# Patient Record
Sex: FEMALE | Race: BLACK OR AFRICAN AMERICAN | HISPANIC OR LATINO | Employment: UNEMPLOYED | URBAN - METROPOLITAN AREA
[De-identification: names, ages, dates, MRNs, and addresses within clinical notes are randomized per-mention and may not be internally consistent; named-entity substitution may affect disease eponyms.]

---

## 2019-05-13 ENCOUNTER — HOSPITAL ENCOUNTER (EMERGENCY)
Facility: HOSPITAL | Age: 24
Discharge: HOME | End: 2019-05-13
Attending: EMERGENCY MEDICINE
Payer: MEDICAID

## 2019-05-13 VITALS
TEMPERATURE: 99 F | DIASTOLIC BLOOD PRESSURE: 70 MMHG | SYSTOLIC BLOOD PRESSURE: 129 MMHG | RESPIRATION RATE: 20 BRPM | HEIGHT: 65 IN | HEART RATE: 82 BPM | WEIGHT: 130 LBS | OXYGEN SATURATION: 100 % | BODY MASS INDEX: 21.66 KG/M2

## 2019-05-13 DIAGNOSIS — R11.0 NAUSEA: ICD-10-CM

## 2019-05-13 DIAGNOSIS — F41.9 ANXIETY: Primary | ICD-10-CM

## 2019-05-13 DIAGNOSIS — R07.9 CHEST PAIN: ICD-10-CM

## 2019-05-13 DIAGNOSIS — R07.89 CHEST WALL PAIN: ICD-10-CM

## 2019-05-13 LAB
B-HCG UR QL: NEGATIVE
CTP QC/QA: YES

## 2019-05-13 PROCEDURE — 99284 EMERGENCY DEPT VISIT MOD MDM: CPT | Mod: 25

## 2019-05-13 PROCEDURE — 93005 ELECTROCARDIOGRAM TRACING: CPT

## 2019-05-13 PROCEDURE — 25000003 PHARM REV CODE 250: Performed by: NURSE PRACTITIONER

## 2019-05-13 PROCEDURE — 81025 URINE PREGNANCY TEST: CPT | Performed by: NURSE PRACTITIONER

## 2019-05-13 RX ORDER — IBUPROFEN 600 MG/1
600 TABLET ORAL
Status: COMPLETED | OUTPATIENT
Start: 2019-05-13 | End: 2019-05-13

## 2019-05-13 RX ORDER — ONDANSETRON 4 MG/1
4 TABLET, ORALLY DISINTEGRATING ORAL
Status: COMPLETED | OUTPATIENT
Start: 2019-05-13 | End: 2019-05-13

## 2019-05-13 RX ORDER — LORAZEPAM 0.5 MG/1
0.5 TABLET ORAL
Status: COMPLETED | OUTPATIENT
Start: 2019-05-13 | End: 2019-05-13

## 2019-05-13 RX ORDER — ACETAMINOPHEN 325 MG/1
650 TABLET ORAL
Status: DISCONTINUED | OUTPATIENT
Start: 2019-05-13 | End: 2019-05-13

## 2019-05-13 RX ORDER — ONDANSETRON 4 MG/1
4 TABLET, ORALLY DISINTEGRATING ORAL EVERY 8 HOURS PRN
Qty: 10 TABLET | Refills: 0 | Status: SHIPPED | OUTPATIENT
Start: 2019-05-13 | End: 2019-05-24

## 2019-05-13 RX ADMIN — IBUPROFEN 600 MG: 600 TABLET, FILM COATED ORAL at 11:05

## 2019-05-13 RX ADMIN — LORAZEPAM 0.5 MG: 0.5 TABLET ORAL at 11:05

## 2019-05-13 RX ADMIN — ONDANSETRON 4 MG: 4 TABLET, ORALLY DISINTEGRATING ORAL at 11:05

## 2019-05-13 NOTE — ED NOTES
Pt up to bathroom. Very dramatic, stating she is dying and then continually asking if she is okay, is her heart okay. Redirectable.

## 2019-05-13 NOTE — DISCHARGE INSTRUCTIONS
Increase your water intake. Return to the ED if your condition changes, progresses, or if you have any concerns.

## 2019-05-13 NOTE — ED TRIAGE NOTES
"Pt reports that she is from [place] and is visiting her cousin here. She reports that she has a hx of alcohol abuse and has been drinking heavily since Thursday. Pt arrives to ER with complaints of chest pain and feelings that "she is going to die". Pt is very dramatic, erratic behavior and continually asking if her heart is okay, and if she is going to die. Redirectable.  "

## 2019-05-13 NOTE — ED NOTES
APPEARANCE: Alert, oriented and in no acute distress.  CARDIAC: Normal rate and rhythm, no murmur heard.   PERIPHERAL VASCULAR: peripheral pulses present. Normal cap refill. No edema. Warm to touch.    RESPIRATORY:Normal rate and effort, breath sounds clear bilaterally throughout chest. Respirations are equal and unlabored no obvious signs of distress.  GASTRO: soft, bowel sounds normal, no tenderness, no abdominal distention.  MUSC: Full ROM. No bony tenderness or soft tissue tenderness. No obvious deformity.  SKIN: Skin is warm and dry, normal skin turgor, mucous membranes moist.  NEURO: 5/5 strength major flexors/extensors bilaterally. Sensory intact to light touch bilaterally. Oaks coma scale: eyes open spontaneously-4, oriented & converses-5, obeys commands-6. No neurological abnormalities.   MENTAL STATUS: awake, alert and aware of environment.  EYE: PERRL, both eyes: pupils brisk and reactive to light. Normal size.  ENT: EARS: no obvious drainage. NOSE: no active bleeding.   Pt complains of chest pain and alcohol withdrawals. Dramatic in triage and when brought back to room.

## 2019-05-13 NOTE — ED PROVIDER NOTES
"Encounter Date: 5/13/2019       History     Chief Complaint   Patient presents with    Chest Pain     that began yesterday  pt also complains of increased anxiety, and drinking heavy for the past 3 days and states " i feel like i am in withdrawls"      23-year-old female presents the ED for chest pain.  The patient reports that since Sunday she has had constant left sided chest pain. Pain is worse with movement and certain positions.  She denies trauma, fever/chills, cough, hemoptysis, and vomiting.  The patient reports that she is anxious but left her anxiety medications at home in [place].  Pt flew from [place] on Thursday.  She is unsure of the name of her medication.  Pt also admits to drinking alcohol heavily since Thursday.  Her last drink was yesterday around 2pm.  She denies vomiting but does have nausea.  She denies drug use and history of cardiac disease.  Pt states that she feels like she is in alcohol withdrawals as she feels "shaky and my anxiety is messing with me."  The patient reports that she has never had to go to rehab due to alcohol abuse.  Pt does smoke cigarettes.  No other complaints at this time.     The history is provided by the patient and a friend.     Review of patient's allergies indicates:   Allergen Reactions    Sulfa (sulfonamide antibiotics)      History reviewed. No pertinent past medical history.  No past surgical history on file.  History reviewed. No pertinent family history.  Social History     Tobacco Use    Smoking status: Not on file   Substance Use Topics    Alcohol use: Not on file    Drug use: Not on file     Review of Systems   Constitutional: Positive for appetite change and chills. Negative for fever.   HENT: Negative for congestion.    Respiratory: Negative for cough and shortness of breath.    Cardiovascular: Positive for chest pain and palpitations.   Gastrointestinal: Positive for nausea. Negative for diarrhea and vomiting.   Musculoskeletal: Negative for " back pain.   Skin: Negative for rash.   Neurological: Negative for weakness, light-headedness and headaches.   Hematological: Does not bruise/bleed easily.   Psychiatric/Behavioral: Negative for confusion. The patient is nervous/anxious.        Physical Exam     Initial Vitals [05/13/19 1038]   BP Pulse Resp Temp SpO2   127/78 106 20 98.5 °F (36.9 °C) 97 %      MAP       --         Physical Exam    Nursing note and vitals reviewed.  Constitutional: Vital signs are normal. She appears well-developed and well-nourished. She is active and cooperative. She is easily aroused.  Non-toxic appearance. She does not have a sickly appearance. She does not appear ill. No distress.   Patient is clinically sober.   HENT:   Head: Normocephalic and atraumatic.   Eyes: Conjunctivae and EOM are normal.   Neck: Normal range of motion. Neck supple.   Cardiovascular: Normal rate, regular rhythm and normal heart sounds.   Pulses:       Radial pulses are 2+ on the right side, and 2+ on the left side.   Pulmonary/Chest: Effort normal and breath sounds normal. She exhibits tenderness and bony tenderness. She exhibits no crepitus, no edema, no deformity, no swelling and no retraction.       Abdominal: Soft. Normal appearance and bowel sounds are normal. She exhibits no distension. There is no tenderness. There is no rigidity and no guarding.   Musculoskeletal:   No LE edema, pain, or erythema.    Neurological: She is alert, oriented to person, place, and time and easily aroused. She has normal strength. She displays no tremor. No sensory deficit. Coordination and gait normal. GCS eye subscore is 4. GCS verbal subscore is 5. GCS motor subscore is 6.   Skin: Skin is warm, dry and intact. Capillary refill takes less than 2 seconds. No bruising and no rash noted. No erythema. No pallor.   Psychiatric: Her speech is normal and behavior is normal. Judgment and thought content normal. Her mood appears anxious. Cognition and memory are normal.  "        ED Course   Procedures  Labs Reviewed   POCT URINE PREGNANCY          Imaging Results          X-Ray Chest 1 View (Final result)  Result time 05/13/19 11:08:42    Final result by Josue Brown MD (05/13/19 11:08:42)                 Impression:      No acute abnormality.      Electronically signed by: Josue Brown MD  Date:    05/13/2019  Time:    11:08             Narrative:    EXAMINATION:  XR CHEST 1 VIEW    CLINICAL HISTORY:  Chest pain, unspecified    TECHNIQUE:  Single frontal view of the chest was performed.    COMPARISON:  None    FINDINGS:  The lungs are clear, with normal appearance of pulmonary vasculature and no pleural effusion or pneumothorax.    The cardiac silhouette is normal in size. The hilar and mediastinal contours are unremarkable.    Bones are intact.                                 Medical Decision Making:   Initial Assessment:   23-year-old female here for left-sided chest pain constantly since yesterday.  She also reports to heavy alcohol consumption over the past few days.  Her last drink was yesterday around 2:00 p.m..  No vomiting. She denies drug use.  Patient appears well, nontoxic.  Vitals stable.  Reproducible left anterior chest pain to palpation. No respiratory distress. No tremors or seizure activity.  Differential Diagnosis:   Anxiety, chest wall pain, pneumothorax, dysrhythmia  Clinical Tests:   Radiological Study: Ordered and Reviewed  Medical Tests: Ordered and Reviewed  ED Management:  Zofran ODT, Motrin, chest x-ray, EKG  Other:   I have discussed this case with another health care provider.       <> Summary of the Discussion: Reviewed with  who agrees with ED course and disposition.   11:40 AM  Pt is tolerating oral fluids. CXR negative for acute changes.  Pt states, "I'm still tweaking! I need something for my anxiety!"  Ativan ordered PO.     I do not suspect ACS or PE.  Pt's symptoms due to anxiety and chest wall pain, exacerbated by heavy " alcohol consumption over the past few days. Repeat HR <100 after zofran and motrin.   12:14 PM  Pt states that she is feeling much better after ativan. Pt will be DC'd home. Oxygen saturation 100% on RA. Pt is no longer tachycardic.  CP improved after ativan and motrin. Pt is not in alcohol withdrawal. No indication for labs at this time. Encouraged increased water intake and no alcohol.  F/u with PCP within 3-4 days. Return to the ED if condition changes, progresses, or if there are any concerns. Pt verbalized understanding, compliance, and agreement with the treatment plan. Rx Zofran ODT              Attending Attestation:     Physician Attestation Statement for NP/PA:   I discussed this assessment and plan of this patient with the NP/PA, but I did not personally examine the patient. The face to face encounter was performed by the NP/PA.                     Clinical Impression:       ICD-10-CM ICD-9-CM   1. Anxiety F41.9 300.00   2. Chest pain R07.9 786.50   3. Chest wall pain R07.89 786.52   4. Nausea R11.0 787.02                                MILTON Em  05/13/19 1216       Jn Mccallum MD  05/13/19 1225

## 2019-05-23 ENCOUNTER — HOSPITAL ENCOUNTER (EMERGENCY)
Facility: HOSPITAL | Age: 24
Discharge: HOME | End: 2019-05-23
Attending: EMERGENCY MEDICINE
Payer: MEDICAID

## 2019-05-23 VITALS
RESPIRATION RATE: 20 BRPM | HEIGHT: 65 IN | BODY MASS INDEX: 21.66 KG/M2 | HEART RATE: 73 BPM | WEIGHT: 130 LBS | OXYGEN SATURATION: 97 % | TEMPERATURE: 99 F | DIASTOLIC BLOOD PRESSURE: 69 MMHG | SYSTOLIC BLOOD PRESSURE: 111 MMHG

## 2019-05-23 DIAGNOSIS — F31.78 BIPOLAR DISORDER, IN FULL REMISSION, MOST RECENT EPISODE MIXED: ICD-10-CM

## 2019-05-23 DIAGNOSIS — F10.10 ALCOHOL ABUSE: ICD-10-CM

## 2019-05-23 DIAGNOSIS — F41.9 ANXIETY: Primary | ICD-10-CM

## 2019-05-23 PROCEDURE — 25000003 PHARM REV CODE 250: Performed by: EMERGENCY MEDICINE

## 2019-05-23 PROCEDURE — 99283 EMERGENCY DEPT VISIT LOW MDM: CPT

## 2019-05-23 RX ORDER — ALPRAZOLAM 0.25 MG/1
0.25 TABLET ORAL
Status: COMPLETED | OUTPATIENT
Start: 2019-05-23 | End: 2019-05-23

## 2019-05-23 RX ORDER — CHLORDIAZEPOXIDE HYDROCHLORIDE 5 MG/1
5 CAPSULE, GELATIN COATED ORAL 3 TIMES DAILY
Qty: 18 CAPSULE | Refills: 0 | Status: SHIPPED | OUTPATIENT
Start: 2019-05-23 | End: 2019-05-29

## 2019-05-23 RX ORDER — ACETAMINOPHEN 500 MG
1000 TABLET ORAL
Status: COMPLETED | OUTPATIENT
Start: 2019-05-23 | End: 2019-05-23

## 2019-05-23 RX ADMIN — ACETAMINOPHEN 1000 MG: 500 TABLET ORAL at 03:05

## 2019-05-23 RX ADMIN — ALPRAZOLAM 0.25 MG: 0.25 TABLET ORAL at 03:05

## 2019-05-23 NOTE — ED TRIAGE NOTES
"Patient presents to ER w/ c/o anxiety and nausea, pt states her anxiety started earlier today, pt states she normally takes xanax at home but she did not bring the medication on the plane w/ her bc "I didn't wanna get arrested". Pt is anxious, restless, and pacing the floor in the room. Pt slightly tachycardic, but vitals otherwise stable. No distress noted.  "

## 2019-05-23 NOTE — ED PROVIDER NOTES
"Encounter Date: 5/23/2019    SCRIBE #1 NOTE: I, Monroe Bryan, am scribing for, and in the presence of,  Dr. Henriquez. I have scribed the entire note.       History     Chief Complaint   Patient presents with    Anxiety     reports generalized anxiety. pt just left AMA from ED. pt states "i left cause my anxiety was so high."      Lucy Carlisle is a 23 y.o. female who  has no past medical history on file.    The patient presents to the ED due to anxiety.   The patient was recently seen at this facility earlier today but left AMA.  The patient states she left because her anxiety was high and no one was tending to her.  She states when she went home she called EMS due to no change in symptoms.  She states every time she laid down she felt dizzy.  She states she initially came to the ED for chest pain and palpitations.  She states she was concerned because she recently stopped drinking alcohol.  She states she drank 1/5 of Vodka each day.  She states her last drink was this morning.  She states she is from [place] and that she doesn't have her anxiety medicine.  She states she takes Xanax for her anxiety.   She denies SI or HI.  The patient notes she always has chest pain when she feels anxious.  She denies a history of cardiac problems.   The patient reports no other symptoms.     The history is provided by the patient.     Review of patient's allergies indicates:   Allergen Reactions    Sulfa (sulfonamide antibiotics)      No past medical history on file.  No past surgical history on file.  No family history on file.  Social History     Tobacco Use    Smoking status: Not on file   Substance Use Topics    Alcohol use: Not on file    Drug use: Not on file     Review of Systems   Constitutional: Negative for chills and fever.   HENT: Negative for congestion, rhinorrhea and sore throat.    Eyes: Negative for redness and visual disturbance.   Respiratory: Negative for cough, shortness of breath and wheezing.  "   Cardiovascular: Positive for chest pain and palpitations.   Gastrointestinal: Negative for abdominal pain, diarrhea, nausea and vomiting.   Genitourinary: Negative for dysuria and hematuria.   Musculoskeletal: Negative for back pain, myalgias and neck pain.   Skin: Negative for rash.   Neurological: Negative for dizziness, weakness and light-headedness.   Psychiatric/Behavioral: Negative for confusion. The patient is nervous/anxious.        Physical Exam     Initial Vitals [05/23/19 1511]   BP Pulse Resp Temp SpO2   120/67 107 20 98.7 °F (37.1 °C) 96 %      MAP       --         Physical Exam    Nursing note and vitals reviewed.  Constitutional: She appears well-developed and well-nourished. She is not diaphoretic. No distress.   HENT:   Head: Normocephalic and atraumatic.   Mouth/Throat: Oropharynx is clear and moist. No oropharyngeal exudate.   Eyes: Conjunctivae and EOM are normal. Pupils are equal, round, and reactive to light.   Neck: Normal range of motion. Neck supple.   Cardiovascular: Normal rate, regular rhythm and normal heart sounds.   No murmur heard.  Pulmonary/Chest: Breath sounds normal. She has no wheezes. She has no rhonchi. She has no rales.   Abdominal: Soft. There is no tenderness. There is no rebound and no guarding.   Musculoskeletal: Normal range of motion. She exhibits no edema or tenderness.   Lymphadenopathy:     She has no cervical adenopathy.   Neurological: She is alert and oriented to person, place, and time. She has normal strength.   Skin: Skin is warm and dry.   Psychiatric:   Mildy anxious.         ED Course   Procedures  Labs Reviewed - No data to display       Imaging Results    None          Medical Decision Making:   Initial Assessment:   23 y.o female presents to the ED due to anxiety. Patient c/o chest pain. Will treat symptoms and reassess.  Differential Diagnosis:   Decompensated psychiatric disease (schizophrenia, bipolar disorder, major depression), excited delirium,  medication noncompliance, substance abuse/withdrawal, intentional drug overdose, medication toxicity, APAP/ASA overdose, acute stress reaction, personality disorder, malingering, metabolic derangement     Medical decision making :23-year-old with bipolar disorder also alcoholism presents with complaint of anxiety symptoms which she has experienced in the past which consist of feeling anxious and chest pain, she has no cardiac history she is requesting Xanax which she typically takes, she left her prescription at home in [place] she last drank alcohol at 8:00 a.m. this morning she is alert oriented not hallucinating denies suicidality or depression was seen today previously in our ED and left because she felt like she was not being seen quick enough. She has no other complaints at this time . patient states that she has a return ticket to [place] on Saturday and is staying with friends.  No trauma ,no fevers or chills ,no self-harm .  Patient is medically stable. I feel that it is appropriate to treat her with 1 dose of Xanax 0.1 mg additionally I will give her Librium 9.  Tablets of 10 mg  Rx for anxiety or alcohol related symptoms of withdrawal although she states she is not withdrawing and has no clinical evidence of withdrawa..  She is  discharged home in stable and improved condition                    Clinical Impression:       ICD-10-CM ICD-9-CM   1. Anxiety F41.9 300.00   2. Alcohol abuse F10.10 305.00   3. Bipolar disorder, in full remission, most recent episode mixed F31.78 296.66            I, Dr. Mikal Sood, personally performed the services described in this documentation.   All medical record entries made by the scribe were at my direction and in my presence.   I have reviewed the chart and agree that the record is accurate and complete.   Mikal Sood MD.  3:55 PM 05/23/2019                    Mikal Sood MD  05/23/19 1110

## 2019-05-23 NOTE — ED NOTES
"Patient pacing the room, asking if she can go on a walk. Informed patient need to stay in room until a provider was able to see her, instructed patient to take deep breaths to calm her down. Pt cursing and yelling stating "Man this is why I left the first time, this is bullshit".   "

## 2020-07-04 ENCOUNTER — HOSPITAL ENCOUNTER (EMERGENCY)
Facility: HOSPITAL | Age: 25
Discharge: HOME | End: 2020-07-04
Attending: EMERGENCY MEDICINE
Payer: MEDICAID

## 2020-07-04 VITALS
WEIGHT: 150 LBS | HEART RATE: 68 BPM | BODY MASS INDEX: 24.99 KG/M2 | RESPIRATION RATE: 20 BRPM | TEMPERATURE: 98 F | OXYGEN SATURATION: 98 % | HEIGHT: 65 IN | DIASTOLIC BLOOD PRESSURE: 86 MMHG | SYSTOLIC BLOOD PRESSURE: 116 MMHG

## 2020-07-04 DIAGNOSIS — F41.9 ANXIETY: Primary | ICD-10-CM

## 2020-07-04 DIAGNOSIS — R11.2 NON-INTRACTABLE VOMITING WITH NAUSEA, UNSPECIFIED VOMITING TYPE: ICD-10-CM

## 2020-07-04 LAB
B-HCG UR QL: NEGATIVE
BILIRUB UR QL STRIP: NEGATIVE
CLARITY UR: CLEAR
COLOR UR: YELLOW
CTP QC/QA: YES
GLUCOSE UR QL STRIP: NEGATIVE
HGB UR QL STRIP: ABNORMAL
KETONES UR QL STRIP: NEGATIVE
LEUKOCYTE ESTERASE UR QL STRIP: NEGATIVE
MICROSCOPIC COMMENT: NORMAL
NITRITE UR QL STRIP: NEGATIVE
PH UR STRIP: 6 [PH] (ref 5–8)
POCT GLUCOSE: 93 MG/DL (ref 70–110)
PROT UR QL STRIP: NEGATIVE
RBC #/AREA URNS HPF: 2 /HPF (ref 0–4)
SP GR UR STRIP: 1.01 (ref 1–1.03)
URN SPEC COLLECT METH UR: ABNORMAL
UROBILINOGEN UR STRIP-ACNC: NEGATIVE EU/DL

## 2020-07-04 PROCEDURE — 81000 URINALYSIS NONAUTO W/SCOPE: CPT

## 2020-07-04 PROCEDURE — 99284 EMERGENCY DEPT VISIT MOD MDM: CPT | Mod: 25

## 2020-07-04 PROCEDURE — 81025 URINE PREGNANCY TEST: CPT | Performed by: EMERGENCY MEDICINE

## 2020-07-04 PROCEDURE — 82962 GLUCOSE BLOOD TEST: CPT

## 2020-07-04 PROCEDURE — 25000003 PHARM REV CODE 250: Performed by: NURSE PRACTITIONER

## 2020-07-04 RX ORDER — PROMETHAZINE HYDROCHLORIDE 25 MG/1
25 TABLET ORAL
Status: COMPLETED | OUTPATIENT
Start: 2020-07-04 | End: 2020-07-04

## 2020-07-04 RX ORDER — HYDROXYZINE PAMOATE 25 MG/1
50 CAPSULE ORAL
Status: COMPLETED | OUTPATIENT
Start: 2020-07-04 | End: 2020-07-04

## 2020-07-04 RX ORDER — PROMETHAZINE HYDROCHLORIDE 25 MG/1
25 TABLET ORAL EVERY 6 HOURS PRN
Qty: 15 TABLET | Refills: 0 | Status: SHIPPED | OUTPATIENT
Start: 2020-07-04

## 2020-07-04 RX ORDER — ONDANSETRON 4 MG/1
4 TABLET, ORALLY DISINTEGRATING ORAL
Status: COMPLETED | OUTPATIENT
Start: 2020-07-04 | End: 2020-07-04

## 2020-07-04 RX ORDER — HYDROXYZINE PAMOATE 50 MG/1
50 CAPSULE ORAL EVERY 6 HOURS PRN
Qty: 20 CAPSULE | Refills: 0 | Status: SHIPPED | OUTPATIENT
Start: 2020-07-04

## 2020-07-04 RX ORDER — HYDROXYZINE PAMOATE 25 MG/1
25 CAPSULE ORAL
Status: DISCONTINUED | OUTPATIENT
Start: 2020-07-04 | End: 2020-07-04

## 2020-07-04 RX ADMIN — HYDROXYZINE PAMOATE 50 MG: 25 CAPSULE ORAL at 08:07

## 2020-07-04 RX ADMIN — PROMETHAZINE HYDROCHLORIDE 25 MG: 25 TABLET ORAL at 09:07

## 2020-07-04 RX ADMIN — ONDANSETRON 4 MG: 4 TABLET, ORALLY DISINTEGRATING ORAL at 08:07

## 2020-07-05 NOTE — ED PROVIDER NOTES
Encounter Date: 7/4/2020       History     Chief Complaint   Patient presents with    Emesis     Pt from out of town. States she was drinking last at a bar on Monday. Since then she has been vomiting everyday which is causing her anxiety. She takes medication for anxiety but left in [place]. No fever    Anxiety     The patient is a 24-year-old female with a past medical history of anxiety who presents to the ED complaining of nausea and vomiting for the past 4 days.  She also reports increased anxiety as she is in town visiting from [place] and forgot her prescribed anxiety medication at home.  She denies fever, headache, chills, cough, shortness of breath, chest pain, abdominal pain, back pain, and urinary complaints.  She does report a history of heavy drinking.  She used to drink 1 pt daily, but states that she has cut back.  No auditory or visual hallucinations.  No history of seizures.  No hospitalizations for withdrawals in the past.  Currently unable to tolerate oral intake.  No treatment attempted prior to arrival.    The history is provided by the patient.     Review of patient's allergies indicates:   Allergen Reactions    Sulfa (sulfonamide antibiotics)      Past Medical History:   Diagnosis Date    Anxiety      No past surgical history on file.  History reviewed. No pertinent family history.  Social History     Tobacco Use    Smoking status: Heavy Tobacco Smoker     Packs/day: 0.50     Types: Cigarettes   Substance Use Topics    Alcohol use: Yes     Comment: 1/5 day    Drug use: Not Currently     Review of Systems   Constitutional: Negative for fever.   HENT: Negative for sore throat.    Respiratory: Negative for shortness of breath.    Cardiovascular: Negative for chest pain.   Gastrointestinal: Positive for nausea and vomiting.   Genitourinary: Negative for dysuria.   Musculoskeletal: Negative for back pain.   Skin: Negative for rash.   Neurological: Negative for dizziness and weakness.    Hematological: Does not bruise/bleed easily.   Psychiatric/Behavioral: The patient is nervous/anxious.        Physical Exam     Initial Vitals [07/04/20 1931]   BP Pulse Resp Temp SpO2   119/79 74 20 98.7 °F (37.1 °C) 100 %      MAP       --         Physical Exam    Nursing note and vitals reviewed.  Constitutional: She appears well-developed and well-nourished.  Non-toxic appearance. No distress.   The patient is alert, oriented, and nontoxic appearing.  No apparent distress.   HENT:   Head: Normocephalic and atraumatic.   Right Ear: Hearing and external ear normal.   Left Ear: Hearing and external ear normal.   Nose: Nose abnormal.   Mouth/Throat: Mucous membranes are normal.   Eyes: Conjunctivae and EOM are normal.   Neck: Full passive range of motion without pain. Neck supple. No neck rigidity.   Cardiovascular: Normal rate, regular rhythm, normal heart sounds and normal pulses. Exam reveals no gallop and no friction rub.    No murmur heard.  Pulmonary/Chest: Effort normal and breath sounds normal. No respiratory distress. She has no decreased breath sounds. She has no wheezes. She has no rhonchi. She has no rales.   Abdominal: Soft. Bowel sounds are normal. There is no abdominal tenderness. There is no rigidity, no rebound, no guarding and no CVA tenderness.   No abdominal tenderness to palpation   Musculoskeletal: Normal range of motion.   Neurological: She is alert and oriented to person, place, and time. She has normal strength. Gait normal. GCS eye subscore is 4. GCS verbal subscore is 5. GCS motor subscore is 6.   Skin: Skin is warm, dry and intact. Capillary refill takes less than 2 seconds. No rash noted.   Psychiatric: She has a normal mood and affect. Her speech is normal and behavior is normal. Judgment and thought content normal. Cognition and memory are normal.         ED Course   Procedures  Labs Reviewed   URINALYSIS, REFLEX TO URINE CULTURE - Abnormal; Notable for the following components:        Result Value    Occult Blood UA 3+ (*)     All other components within normal limits    Narrative:     Specimen Source->Urine   URINALYSIS MICROSCOPIC    Narrative:     Specimen Source->Urine   POCT URINE PREGNANCY   POCT GLUCOSE   POCT GLUCOSE MONITORING CONTINUOUS          Imaging Results    None          Medical Decision Making:   History:   Old Medical Records: I decided to obtain old medical records.  Clinical Tests:   Lab Tests: Ordered and Reviewed  ED Management:  This is an emergent evaluation of a 24-year-old female who presents to the ED complaining of nausea, vomiting, and increased anxiety.    Physical exam is unremarkable.  There is no abdominal tenderness to palpation.  No perceptible tremors or diaphoresis.  Patient is calm and cooperative.    She is able to tolerate oral intake while being monitored in the ED. She drink 2 cups of water without difficulty.    Patient reports improvement of symptoms following Vistaril, Zofran, and Phenergan administration.    CBG is within normal limits.    Based on history and physical exam, as well as diagnostic results, I considered but do not suspect hypoglycemia, severe dehydration, or delirium tremens.  CIWA score is 8.  Patient will be discharged with prescriptions for Vistaril and Phenergan.  She will follow up with her regular doctor when she returns home.  All questions answered.  Strict return precautions have been discussed.                                   Clinical Impression:       ICD-10-CM ICD-9-CM   1. Anxiety  F41.9 300.00   2. Non-intractable vomiting with nausea, unspecified vomiting type  R11.2 787.01             ED Disposition Condition    Discharge Stable        ED Prescriptions     Medication Sig Dispense Start Date End Date Auth. Provider    hydrOXYzine pamoate (VISTARIL) 50 MG Cap Take 1 capsule (50 mg total) by mouth every 6 (six) hours as needed (anxiety). 20 capsule 7/4/2020  Maame Lizarraga NP    promethazine (PHENERGAN) 25 MG tablet  Take 1 tablet (25 mg total) by mouth every 6 (six) hours as needed for Nausea. 15 tablet 7/4/2020  Maame Lizarraga NP        Follow-up Information     Follow up With Specialties Details Why Contact Info Additional Information    [facility] Schedule an appointment as soon as possible for a visit in 3 days  [address], [address]  [place] [address] [phone] At this time [facility] will only use these entries [facility], [facility], and Emergency Department due to COVID-19 precautions.     [facility]  If symptoms worsen [address] [address] [phone]                                      Maame Lizarraga NP  07/04/20 2172

## 2020-07-05 NOTE — ED TRIAGE NOTES
COMPLAIN OF ANXIETY, HAVE NOT TAKEN SCHEDULED MEDICATIONS DUE TO LEFT THEM AT HOME IN [place]. COMPLAIN OF NAUSEA/VOMITING AFTER DRINKING ALL DAY EVERYDAY.

## 2020-07-05 NOTE — DISCHARGE INSTRUCTIONS
Thank you for allowing me to care for you today.  I hope our treatment plan will make you feel better in the next few days.  In order for me to take better care of my future patients and improve our Emergency Department, I would appreciate if you can provide us with feedback.  In the next few days, you may receive a survey in the mail.  If you do, it would mean a great deal to me if you would please take the time to complete it.    Thank you and I hope you feel better.  Maame Lizarraga NP

## 2020-07-05 NOTE — ED NOTES
APPEARANCE: Alert, oriented and in no acute distress.  CARDIAC: Normal rate and rhythm, no murmur heard.   PERIPHERAL VASCULAR: peripheral pulses present. Normal cap refill. No edema. Warm to touch.    RESPIRATORY:Normal rate and effort, breath sounds clear bilaterally throughout chest. Respirations are equal and unlabored no obvious signs of distress.  GASTRO: soft, bowel sounds normal, no tenderness, no abdominal distention. COMPLAIN OF NAUSEA/VOMITING AFTER CONSUMING ALCOHOL ALL DAY EVERYDAY.   MUSC: Full ROM. No bony tenderness or soft tissue tenderness. No obvious deformity.  SKIN: Skin is warm and dry, normal skin turgor, mucous membranes moist.  NEURO: 5/5 strength major flexors/extensors bilaterally. Sensory intact to light touch bilaterally. [place] coma scale: eyes open spontaneously-4, oriented & converses-5, obeys commands-6. No neurological abnormalities.   MENTAL STATUS: awake, alert and aware of environment.  EYE: PERRL, both eyes: pupils brisk and reactive to light. Normal size.  ENT: EARS: no obvious drainage. NOSE: no active bleeding.   COMPLAIN OF ANXIETY HAVE NOT TAKING SCHEDULED MEDICATIONS DUE TO LEFT THEM AT HOME IN [place].